# Patient Record
Sex: MALE | Race: WHITE | NOT HISPANIC OR LATINO | Employment: OTHER | URBAN - METROPOLITAN AREA
[De-identification: names, ages, dates, MRNs, and addresses within clinical notes are randomized per-mention and may not be internally consistent; named-entity substitution may affect disease eponyms.]

---

## 2020-11-23 ENCOUNTER — OFFICE VISIT CONVERTED (OUTPATIENT)
Dept: PULMONOLOGY | Facility: CLINIC | Age: 60
End: 2020-11-23
Attending: INTERNAL MEDICINE

## 2020-12-02 ENCOUNTER — HOSPITAL ENCOUNTER (OUTPATIENT)
Dept: GENERAL RADIOLOGY | Facility: HOSPITAL | Age: 60
Discharge: HOME | End: 2020-12-02
Attending: INTERNAL MEDICINE

## 2021-02-09 ENCOUNTER — HOSPITAL ENCOUNTER (OUTPATIENT)
Dept: CARDIOLOGY | Facility: HOSPITAL | Age: 61
Discharge: HOME | End: 2021-02-09
Attending: INTERNAL MEDICINE

## 2021-05-27 ENCOUNTER — OFFICE VISIT CONVERTED (OUTPATIENT)
Dept: PULMONOLOGY | Facility: CLINIC | Age: 61
End: 2021-05-27
Attending: SPECIALIST

## 2021-05-28 VITALS
HEIGHT: 67 IN | SYSTOLIC BLOOD PRESSURE: 142 MMHG | TEMPERATURE: 98.4 F | RESPIRATION RATE: 18 BRPM | OXYGEN SATURATION: 89 % | HEART RATE: 48 BPM | BODY MASS INDEX: 34.53 KG/M2 | DIASTOLIC BLOOD PRESSURE: 80 MMHG | WEIGHT: 220 LBS

## 2021-05-28 NOTE — PROGRESS NOTES
Patient: STEFFEN ALCARAZ     Acct: [id number]     Report: #[id number]  UNIT #: [id number]     : 1960    Encounter Date:2020  PRIMARY CARE: YOSEPH PIKE  ***Signed***  --------------------------------------------------------------------------------------------------------------------  Chief Complaint      Encounter Date      2020            Primary Care Provider      OBINNA PIKE            Referring Provider      OBINNA PIKE            Patient Complaint      Patient is complaining of      New Patient- COPD            VITALS      Height 5 ft 7 in / 170.18 cm      Weight 220 lbs 0 oz / 99.301475 kg      BSA 2.11 m2      BMI 34.5 kg/m2      Temperature 98.4 F / 36.89 C - Temporal      Pulse 48      Respirations 18      Blood Pressure 142/80 Sitting, Right Arm      Pulse Oximetry 89%, nasal cannula , 2 lpm            HPI      The patient is a 60 year old male with a history of COPD, chronic hypoxic and     hypercapnic respiratory failure. He was seeing Dr. Grigsby in pulmonology in     [place].  He sees ALEXANDRA Eddy for primary care. He is referred to     me for COPD, chronic hypoxic and hypercapnic respiratory failure. He has     shortness of breath with activities. He continues to use oxygen with rest,     activity and sleep. He use to use trilogy with sleep, but then sent the machine     back because he was feeling like it did not help him much. He is wanting to go     back on trilogy. He is on Breo and Incruse and albuterol nebulizer which he uses    2-3 times a day.  He is on albuterol for rescue which he uses 1-2 times a day.     He has shortness of breath with activities. He is not able to move around much.     He has an [employer] concentrator that he moves around at home with. He has not had     low dose lung cancer screening CT scan recently. He has not had pulmonary     function test and six minute walk test.            ROS      Constitutional:  Complains of:  Fatigue; Denies: Fever, Weight gain, Weight loss,    Chills, Insomnia, Other      Respiratory/Breathing:  Complains of: Shortness of air, Wheezing, Cough; Denies:    Hemoptysis, Pleuritic pain, Other      Endocrine:  Denies: Polydipsia, Polyuria, Heat/cold intolerance, Diabetes, Other      Eyes:  Denies: Blurred vision, Vision Changes, Other      Ears, nose, mouth, throat:  Denies: Mouth lesions, Thrush, Throat pain,     Hoarseness, Allergies/Hay Fever, Post Nasal Drip, Headaches, Recent Head Injury,    Nose Bleeding, Neck Stiffness, Thyroid Mass, Hearing Loss, Ear Fullness, Dry     Mouth, Nasal or Sinus Pain, Dry Lips, Nasal discharge, Nasal congestion, Other      Cardiovascular:  Denies: Palpitations, Syncope, Claudication, Chest Pain, Wake     up Gasping for air, Leg Swelling, Irregular Heart Rate, Cyanosis, Dyspnea on     Exertion, Other      Gastrointestinal:  Denies: Nausea, Constipation, Diarrhea, Abdominal pain, Vomit    ing, Difficulty Swallowing, Reflux/Heartburn, Dysphagia, Jaundice, Bloating,     Melena, Bloody stools, Other      Genitourinary:  Denies: Urinary frequency, Incontinence, Hematuria, Urgency,     Nocturia, Dysuria, Testicular problems, Other      Musculoskeletal:  Denies: Joint Pain, Joint Stiffness, Joint Swelling, Myalgias,    Other      Hematologic/lymphatic:  DENIES: Lymphadenopathy, Bruising, Bleeding tendencies,     Other      Neurological:  Denies: Headache, Numbness, Weakness, Seizures, Other      Psychiatric:  Denies: Anxiety, Appropriate Effect, Depression, Other      Sleep:  No: Excessive daytime sleep, Morning Headache?, Snoring, Insomnia?, Stop    breathing at sleep?, Other      Integumentary:  Denies: Rash, Dry skin, Skin Warm to Touch, Other      Immunologic/Allergic:  Denies: Latex allergy, Seasonal allergies, Asthma,     Urticaria, Eczema, Other      Immunization status:  No: Up to date            FAMILY/SOCIAL/MEDICAL HX      Surgical History:  Yes: Bowel Surgery  (COLONOSCOPY), Oral Surgery (TONSILLECTOMY    AND ADENOIDECTOMY); No: AAA Repair, Abdominal Surgery, Adenoids, Angioplasty,     Appendectomy, Back Surgery, Bladder Surgery, Breast Surgery, CABG, Carotid     Stenosis, Cholecystectomy, Ear Surgery, Eye Surgery, Head Surgery, Hernia Talley    rgery, Kidney Surgery, Nose Surgery, Orthopedic Surgery, Prostatectomy, Rectal     Surgery, Spinal Surgery, Testicular Surgery, Throat Surgery, Tonsils, Valve     Replacement, Vascular Surgery, Other Surgeries      Diabetes - Family Hx:  Sister      Is Father Still Living?:  No      Is Mother Still Living?:  No       Family History:  Yes      Social History:  No Tobacco Use, No Alcohol Use, No Recreational Drug use      Smoking status:  Former smoker (started at age 12 stopped at age 55 about 1 ppd     )      Anticoagulation Therapy:  No      Antibiotic Prophylaxis:  No      Medical History:  Yes: Arthritis, Hemorrhoids/Rectal Prob (REFLUX), Shortness Of    Breath (EMPHYSEMA), Miscellaneous Medical/oth (ABD. HERNIA ); No: Alcoholism,     Allergies, Anemia, Asthma, Atrial Fibrillation, Blood Disease, Broken Bones,     Cataracts, Chemical Dependency, Chemotherapy/Cancer, Chronic Bronchitis/COPD,     Emphysema, Chronic Liver Disease, Colon Trouble, Colitis, Diverticulitis,     Congestive Heart Failu, Deafness or Ringing Ears, Convulsions, Depression,     Anxiety, Bipolar Disorder, PTSD, Diabetes, Epilepsy, Seizures, Forgetfullness,     Glaucoma, Gall Stones, Gout, Head Injury, Heart Attack, Heart Murmur, GERD,     Hepatitis, Hiatal Hernia, High Blood Pressure, High Cholesterol, HIV (Do not ask    - volu, Jaundice, Kidney or Bladder Disease, Kidney Stones, Migrane Headaches,     Mitral Valve Prolapse, Night sweats, Phlebitis, Psychiatric Care, Reflux     Disease, Rheumatic Fever, Sexually Transmitted Dis, Sinus Trouble, Skin     Disease/Psoriais/Ecz, Stroke, Thyroid Problem, Tuberculosis or Pos TB Te      Psychiatric History      none     "        PREVENTION      Hx Influenza Vaccination:  Yes      Date Influenza Vaccine Given:  Nov 1, 2020      Influenza Vaccine Declined:  No      2 or More Falls in Past Year?:  No      Fall Past Year with Injury?:  No      Hx Pneumococcal Vaccination:  Yes      Encouraged to follow-up with:  PCP regarding preventative exams.      Chart initiated by      Amy Ballard MA            ALLERGIES/MEDICATIONS      Allergies:        Coded Allergies:             SULFAMETHOXAZOLE (Verified  Allergy, Unknown, ANAPHYLACTIC, 11/23/20)           TRIMETHOPRIM (Verified  Allergy, Unknown, ANAPHYLACTIC, 11/23/20)           SULFA(SULFONAMIDE ANTIBIOTICS) (Verified  Allergy, \"stops my lungs\",     11/23/20)      Medications    Last Reconciled on 11/23/20 14:02 by CLARE PAT MD      Cholecalciferol (Vitamin D3) (Vitamin D3) 400 Units Capsule      50 UNITS PO QDAY for 30 Days, #4 CAP         Reported         11/23/20       Fexofenadine HCl (Allegra Allergy) 60 Mg Tablet      60 MG PO BID, #60 TAB 0 Refills         Reported         11/23/20       Pravastatin Sodium (Pravastatin Sodium) 40 Mg Tablet      40 MG PO HS, #30 TAB 0 Refills         Reported         11/23/20       Allopurinol (Zyloprim*) 100 Mg Tablet      100 MG PO QDAY for 30 Days, #30 TAB 0 Refills         Reported         11/23/20       Folic Acid (Folic Acid*) 0.4 Mg Tablet      400 MCG PO QDAY, TAB         Reported         11/23/20       Venlafaxine HCl (Venlafaxine*) 37.5 Mg Tablet      75 MG PO BID, TAB         Reported         11/23/20       Umeclidinium Bromide (Incruse Ellipta) 62.5 Mcg Blst.w.dev      1 PUFF INH RTQDAY, #1 MDI 0 Refills         Reported         11/23/20       Fluticasone/Vilanterol 200-25 Mcg Inh (Breo Ellipta 200-25 Mcg Inh) 1 Each     Blst.w.dev      1 PUFF INH QDAY, #1 INH         Reported         11/23/20       Potassium Chloride (K-Dur*) 10 Meq Tab.prt.sr      10 MEQ PO BID         Reported         12/7/12       Furosemide (Furosemide) 40 Mg " Tablet      80 MG PO BID         Reported         12/7/12       Carvedilol (Coreg) 12.5 Mg Tab      12.5 MG PO BID         Reported         12/7/12       Montelukast Sodium (Singulair*) 10 Mg Tab      10 MG PO HS, TAB         Reported         12/7/12      Current Medications      Current Medications Reviewed 11/23/20            EXAM      CONSTITUTIONAL: Pleasant male on oxygen concentrator, has conversational     dyspnea.        EYES : Pink conjunctive, no ptosis, PERRL.       ENMT : Nose and ears appear normal, normal dentition, mild posterior pharyngeal     wall erythema, no sinus tenderness. Mallampati classification 2.        Neck: Nontender, no masses, no thyromegaly, no nodules.      Resp : Bilateral diminished breath sounds, scattered rhonchi, no wheezing or     crackles.  Resonant to percussion bilaterally.      CVS  : No carotid bruits, s1s2 nl, RRR, no murmur, rubs or gallop, trace pedal     edema, extremities bilaterally nontender to palpation.        Chest wall: Normal rise with inspiration, nontender on palpation.      GI   : Abdomen soft, with no masses, no hepatosplenomegaly, no hernias, BS+      MSK  : Normal gait and station, no digital cyanosis or clubbing       Skin : No rashes, ulcerations or lesions, normal turgor and temperature      Neuro: CN II - XII intact, no sensory deficits, DTRs intact and symmetrical, no     motor weakness      Psych: Appropriate affect, A   Vtials      Vitals:             Height 5 ft 7 in / 170.18 cm           Weight 220 lbs 0 oz / 99.054303 kg           BSA 2.11 m2           BMI 34.5 kg/m2           Temperature 98.4 F / 36.89 C - Temporal           Pulse 48           Respirations 18           Blood Pressure 142/80 Sitting, Right Arm           Pulse Oximetry 89%, nasal cannula , 2 lpm            REVIEW      Results Reviewed      P[facility] Results Reviewed?:  Yes Prev Lab Results, Yes Prev Radiology Results, Yes     Previous [facility] Records      Lab Results      The patient's  office visit note from PCP was reviewed. Other tests and results     were not available for review.            Assessment      COPD (chronic obstructive pulmonary disease)         Centrilobular emphysema - J43.2         Emphysema type: centrilobular            Notes      New Medications      * Fluticasone/Vilanterol 200-25 Mcg Inh (Breo Ellipta 200-25 Mcg Inh) 1 EACH       BLST.W.DEV: 1 PUFF INH QDAY #1      * UMECLIDINIUM BROMIDE (Incruse Ellipta) 62.5 MCG BLST.W.DEV: 1 PUFF INH RTQDAY       #1      * Venlafaxine HCl (Venlafaxine*) 37.5 MG TABLET: 75 MG PO BID      * Folic Acid (Folic Acid*) 0.4 MG TABLET: 400 MCG PO QDAY      * ALLOPURINOL (Zyloprim*) 100 MG TABLET: 100 MG PO QDAY 30 Days #30      * Pravastatin Sodium 40 MG TABLET: 40 MG PO HS #30      * Fexofenadine HCl (Allegra Allergy) 60 MG TABLET: 60 MG PO BID #60      * Cholecalciferol (Vitamin D3) (Vitamin D3) 400 UNITS CAPSULE: 50 UNITS PO QDAY       30 Days #4      * Fluticasone/Umeclidin/Vilanter (Trelegy Ellipta 100-62.5-25) 1 EACH       BLST.W.DEV: 1 PUFF INH RTQDAY #1      New Diagnostics      * PFT-Comp, PrePost,DLCO,BodyBox, Week         Dx: COPD (chronic obstructive pulmonary disease) - J44.9      * 6 Min Walk w O2 Titration Test, Routine         Dx: COPD (chronic obstructive pulmonary disease) - J44.9      * ABGS, Month         Dx: COPD (chronic obstructive pulmonary disease) - J44.9      * Low Dose Chest CT, Week         Dx: COPD (chronic obstructive pulmonary disease) - J44.9      New Office Procedures      * Pneumovax-23, As Soon As Possible         Pneumococcal Vaccine Polyvalen (Pneumovax-23) 25 MCG/0.5 ML VIAL: 25        MICROGRAM INTRAMUSCULARLY Qty 25 MCG      PLAN: The patient is a 60 year old male with a history of COPD with a history of    smoking in the past, chronic hypoxia and hypercapnic respiratory failure.  He     also has obstructive sleep apnea and obesity hypoventilation syndrome and was     suppose to be on Trilogy.      1.  COPD with chronic hypoxic and hypercapnic respiratory failure. Continue with     inhalers. I will switch Breo and Incruse to Trelegy ellipta inhaler once daily.     Use albuterol inhaler and nebulizer as needed.      2. Check pulmonary function test and six minute walk test.        3. I will refer him to pulmonary rehab next office visit.      4. Chronic hypoxic and hypercapnic respiratory failure.  Continue oxygen with     rest, activities and sleep. He will need to go back on Trilogy machine with     sleep. I will check arterial blood gas.  He will call our office to tell us     which [employer] company he used for Trilogy machine. We will call them and try to get     the Trilogy machine back on.  I will review the Trilogy machine data once it is     available.      5. Low dose lung cancer screening CT scan indication was discussed with him.  He    understands and is agreeable and I will order low dose lung cancer screening CT     scan now.  Shared decision making was done to order low dose lung cancer     screening CT scan.      6. Follow up in 2-3 months, sooner if needed.            Patient Education      Education resources provided:  Yes      Patient Education Provided:  Acute Bronchitis            Patient Education:        Chronic Obstructive Pulmonary Disease            Electronically signed by Franco Sanderson  11/25/2020 21:06       Disclaimer: Converted document may not contain table formatting or lab diagrams. Please see [facility] for the authenticated document.

## 2021-06-06 VITALS
BODY MASS INDEX: 32.02 KG/M2 | RESPIRATION RATE: 14 BRPM | DIASTOLIC BLOOD PRESSURE: 63 MMHG | HEART RATE: 77 BPM | HEIGHT: 67 IN | OXYGEN SATURATION: 93 % | SYSTOLIC BLOOD PRESSURE: 116 MMHG | WEIGHT: 204 LBS

## 2021-06-06 NOTE — PROGRESS NOTES
Patient: STEFFEN ALCARAZ     Acct: [id number]     Report: #[id number]  UNIT #: [id number]     : 1960    Encounter Date:2021  PRIMARY CARE: YOSEPH PIKE  ***Signed***  --------------------------------------------------------------------------------------------------------------------  Chief Complaint      Encounter Date      May 27, 2021            Primary Care Provider      OBINNA PIKE            Referring Provider      OBINNA PIKE            Patient Complaint      Patient is complaining of      Patient is here for a follow up post rehab stay            VITALS      Height 5 ft 7 in / 170.18 cm      Weight 204 lbs 0 oz / 92.10559 kg      BSA 2.04 m2      BMI 32.0 kg/m2      Pulse 77      Respirations 14      Blood Pressure 116/63 Sitting, Left Arm      Pulse Oximetry 93%, on 6liters            HPI      This is a 60-year-old very pleasant gentleman being followed by Dr. Sanderson.      Patient has history obstructive sleep apnea and the COPD.  He is a former smoker    and quit about 13 years ago and at present patient is on Trelegy and noninvasive    positive pressure ventilation trilogy for the chronic hypercapnic, hypoxic     respiratory failure.  Patient continues to complaining of shortness of breath     with any movement and he is in check dial is 50.  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  Unable to review any sleep     study and the patient stated that he has studied in [place] several years ago.      Never had any alpha-1 antitrypsin done.  Patient CT scan of the chest and the     pulmonary function testing are as reported in the hospital information is     reviewed as well.  Patient denies of having any fever, chills, night sweating or    hemoptysis.  Denies any recent travel history or any exposure to anybody who is     sick around him.  At present patient is on oxygen.  Patient also has been having    the leg swellings and was in the hospital  recently.                  [facility] [place]            Patient: STEFFEN ALCARAZ   Acct: #[id number]   Report: #[id number]            UNIT #: [id number]    DOS: 20 1215      INSURANCE:MEDICAID-[place]   ORDER #:[id number]      LOCATION:[facility]     : 1960            PROVIDERS      ADMITTING:     ATTENDING: Franco Sanderson      FAMILY:  NONE,MD   ORDERING:  Franco Sanderson         OTHER:    DICTATING:  Vinnie Hall MD            REQ #:[id number]   [facility]      REASON FOR EXAM:        REASON FOR VISIT:  FORMER SMOKER            *******Signed******         PROCEDURE:   CT LOW DOSE CHEST SCREENING             COMPARISON:   None.             REASON FOR SCREENING:   Patient is 60 years of age, asymptomatic, and has a     smoking history of more       than 30 pack years.      SMOKING STATUS:   Former smoker.  Years since quittin                SCREENING VISIT:   Baseline.                   TECHNIQUE:   Axial unenhanced LDCT images from the apices through mid-kidney     were obtained.       Evaluation of solid organs and vascular structures is suboptimal due to the lack    of IV contrast.       Imaging was performed on a [employer] In[employer]uity 128 CT scanner.             RADIATION:   CT Dose Index Vol (CTDIvol):    3.085  mGy         Dose Length Product (DLP):    123.1  mGy-cm             DIAGNOSTIC QUALITY:   Satisfactory             LUNG-RADS CLASSIFICATION:   Lung RADS 4A             FINDINGS:         Lung window images reveal marked centrilobular emphysema, with numerous blebs at    the lung apices.             8 mm ill-defined noncalcified solitary pulmonary nodule is seen in the posterior    right upper lobe,       well seen on series 204, image 58 and series 202 image 129.              Subsegmental atelectasis and/or linear fibrosis is seen in the right middle lobe    and lingula.              Mediastinal windows reveal no mediastinal, hilar, or axillary adenopathy.  A few    coronary artery       calcifications are evident.             CONTINUED ON NEXT PAGE...             CONCLUSION:         Low-dose screening CT scan of the chest demonstrating 8 mm ill-defined     noncalcified solitary       pulmonary nodule in the right upper lobe.             Follow-up low-dose CT of the chest is recommended in 3 months.             Lung RADS 4A               ERNESTO JIMÉNEZ MD             Electronically Signed and Approved By: ERNESTO JIMÉNEZ MD on 12/02/2020 at 14:34               _________________________________________________________________    _____________________________________             821 - DIAGNOSTIC REPORT             PULMONARY FUNCTION TEST             SPIROMETRY:      Spirometry shows very severe obstructive defect.      FEV1/FVC ratio 48.      FEV1 is 0.61 L, 19% of predicted      FVC is 1.35 L, 31% of predicted             BRONCHODILATOR RESPONSE:      There is a significant response to bronchodilator administration.  FEV1      increased from 0.61 L to 0.67 L, 10% change.  FVC increased from 1.35 L to      1.59 L, 18% change.             LUNG VOLUMES:      Lung volumes show air trapping.      Total lung capacity is 5.43 L, 85% of predicted.      Residual volume is 3.85 L, 183% of predicted.             DIFFUSION:      Diffusion capacity is very severely decreased, 17% of predicted.             FLOW VOLUME LOOP:      Flow volume loop is compatible with very severe obstructive process.             CONCLUSION:      Low FEV1/FVC with low FEV1 and FVC with air trapping and low diffusion      capacity.  It suggests very severe obstructive airway disease, likely      emphysema.  It suggests end-stage lung disease.  Some bronchial reversibility      is seen.  Please correlate clinically.               To be electronically signed in [email or web address]      [id number] CLARE PAT M.D.              NK:lencho      D:   02/18/2021 14:24      T:  02/18/2021 16:50      #[id number]            ROS      Constitutional:  Denies: Fatigue, Fever, Weight gain, Weight loss, Chills,     Insomnia, Other      Respiratory/Breathing:  Complains of: Shortness of air; Denies: Wheezing, Cough,    Hemoptysis, Pleuritic pain, Other      Endocrine:  Denies: Polydipsia, Polyuria, Heat/cold intolerance, Diabetes, Other      Eyes:  Denies: Blurred vision, Vision Changes, Other      Ears, nose, mouth, throat:  Denies: Congestion, Dysphagia, Hearing Changes, Nose    Bleeding, Nasal Discharge, Throat pain, Tinnitus, Other      Cardiovascular:  Denies: Chest Pain, Exertional dyspnea, Peripheral Edema,     Palpitations, Syncope, Wake up Gasping for air, Orthopnea, Tachycardia, Other      Gastrointestinal:  Denies: Abdominal pain/cramping, Bloody stools, Constipation,    Diarrhea, Melena, Nausea, Vomiting, Other      Genitourinary:  Denies: Dysuria, Urinary frequency, Incontinence, Hematuria,     Urgency, Other      Musculoskeletal:  Denies: Joint Pain, Joint Stiffness, Joint Swelling, Myalgias,    Other      Hematologic/lymphatic:  DENIES: Lymphadenopathy, Bruising, Bleeding tendencies,     Other      Neurologic:  Denies: Headache, Numbness, Weakness, Seizures, Other      Psychiatric:  Denies: Anxiety, Appropriate Effect, Depression, Other      Sleep:  No: Excessive daytime sleep, Morning Headache?, Snoring, Insomnia?, Stop    breathing at sleep?, Other      Integumentary:  Denies: Rash, Dry skin, Skin Warm to Touch, Other            FAMILY/SOCIAL/MEDICAL HX      Surgical History:  Yes: Bowel Surgery (COLONOSCOPY), Oral Surgery (TONSILLECTOMY    AND ADENOIDECTOMY); No: AAA Repair, Abdominal Surgery, Adenoids, Angioplasty,     Appendectomy, Back Surgery, Bladder Surgery, Breast Surgery, CABG, Carotid     Stenosis, Cholecystectomy, Ear Surgery, Eye Surgery, Head Surgery, Hernia     Surgery, Kidney Surgery, Nose Surgery, Orthopedic Surgery, Prostatectomy, Rectal     Surgery, Spinal Surgery, Testicular Surgery, Throat Surgery, Tonsils, Valve     Replacement, Vascular Surgery, Other Surgeries      Diabetes - Family Hx:  Sister      Is Father Still Living?:  No      Is Mother Still Living?:  No       Family History:  Yes      Social History:  No Tobacco Use, No Alcohol Use, No Recreational Drug use      Smoking status:  Former smoker (started at age 12 stopped at age 55 about 1 ppd     )      Anticoagulation Therapy:  No      Antibiotic Prophylaxis:  No      Medical History:  Yes: Arthritis, Hemorrhoids/Rectal Prob (REFLUX), Shortness Of    Breath (EMPHYSEMA), Miscellaneous Medical/oth (ABD. HERNIA ); No: Alcoholism,     Allergies, Anemia, Asthma, Atrial Fibrillation, Blood Disease, Broken Bones,     Cataracts, Chemical Dependency, Chemotherapy/Cancer, Chronic Bronchitis/COPD,     Emphysema, Chronic Liver Disease, Colon Trouble, Colitis, Diverticulitis,     Congestive Heart Failu, Deafness or Ringing Ears, Convulsions, Depression,     Anxiety, Bipolar Disorder, PTSD, Diabetes, Epilepsy, Seizures, Forgetfullness,     Glaucoma, Gall Stones, Gout, Head Injury, Heart Attack, Heart Murmur, GERD,     Hepatitis, Hiatal Hernia, High Blood Pressure, High Cholesterol, HIV (Do not ask    - volu, Jaundice, Kidney or Bladder Disease, Kidney Stones, Migrane Headaches,     Mitral Valve Prolapse, Night sweats, Phlebitis, Psychiatric Care, Reflux     Disease, Rheumatic Fever, Sexually Transmitted Dis, Sinus Trouble, Skin     Disease/Psoriais/Ecz, Stroke, Thyroid Problem, Tuberculosis or Pos TB Te      Psychiatric History      none            PREVENTION      Hx Influenza Vaccination:  Yes      Date Influenza Vaccine Given:  Nov 1, 2020      Influenza Vaccine Declined:  No      2 or More Falls in Past Year?:  No      Fall Past Year with Injury?:  No      Hx Pneumococcal Vaccination:  Yes      Encouraged to follow-up with:  PCP regarding preventative exams.      Chart initiated by      Cristel  "[facility]            ALLERGIES/MEDICATIONS      Allergies:        Coded Allergies:             SULFA (SULFONAMIDE ANTIBIOTICS) (Verified  Allergy, Unknown, \"stops my     lungs\", 5/27/21)           SULFAMETHOXAZOLE (Verified  Allergy, Unknown, ANAPHYLACTIC, 5/27/21)           TRIMETHOPRIM (Verified  Allergy, Unknown, ANAPHYLACTIC, 5/27/21)      Medications    Last Reconciled on 5/27/21 15:32 by Caleb Johns      Fluticasone/Umeclidin/Vilanter (Trelegy Ellipta 100-62.5-25) 1 Each Blst.w.dev      1 PUFF INH RTQDAY, #1 INH 9 Refills         Prov: Franco Sanderson         11/23/20       Cholecalciferol (Vitamin D3) (Vitamin D3) 400 Units Capsule      50 UNITS PO QDAY for 30 Days, #4 CAP         Reported         11/23/20       Fexofenadine HCl (Allegra Allergy) 60 Mg Tablet      60 MG PO BID, #60 TAB 0 Refills         Reported         11/23/20       Pravastatin Sodium (Pravastatin Sodium) 40 Mg Tablet      40 MG PO HS, #30 TAB 0 Refills         Reported         11/23/20       Allopurinol (Zyloprim*) 100 Mg Tablet      100 MG PO QDAY for 30 Days, #30 TAB 0 Refills         Reported         11/23/20       Folic Acid (Folic Acid) 0.4 Mg Tablet      400 MCG PO QDAY, TAB         Reported         11/23/20       Venlafaxine HCl (Venlafaxine*) 37.5 Mg Tablet      75 MG PO BID, TAB         Reported         11/23/20       Umeclidinium Bromide (Incruse Ellipta) 62.5 Mcg Blst.w.dev      1 PUFF INH RTQDAY, #1 MDI 0 Refills         Reported         11/23/20       Fluticasone/Vilanterol 200-25 Mcg Inh (Breo Ellipta 200-25 Mcg Inh) 1 Each     Blst.w.dev      1 PUFF INH QDAY, #1 INH         Reported         11/23/20       Potassium Chloride (K-Dur*) 10 Meq Tab.prt.sr      10 MEQ PO BID         Reported         12/7/12       Furosemide (Furosemide) 40 Mg Tablet      80 MG PO BID         Reported         12/7/12       Carvedilol (Coreg) 12.5 Mg Tab      12.5 MG PO BID         Reported         12/7/12       Montelukast Sodium (Singulair*) 10 " Mg Tab      10 MG PO HS, TAB         Reported         12/7/12      Current Medications      Current Medications Reviewed 5/27/21            EXAM      Very pleasant gentleman.  Collected very good x3.  Answering the questions     appropriately and not in any acute distress at rest.  Comfortable      Vitals      Vitals:             Height 5 ft 7 in / 170.18 cm           Weight 204 lbs 0 oz / 92.94354 kg           BSA 2.04 m2           BMI 32.0 kg/m2           Pulse 77           Respirations 14           Blood Pressure 116/63 Sitting, Left Arm           Pulse Oximetry 93%, on 6liters            Constitutional      General appearance:  Chronically ill            Eyes      Conjunctiva:  Bilateral: Normal            ENMT      Nasal cavity:           Nostril:  Bilateral: Septal deviation         Discharge:  Bilateral: None            Neck      JVP:  Normal      Trachea:  Midline            Respiratory      Respiratory effort:  normal      Chest appearance:  Increased AP diameter      Auscultation:  Bilateral: Diminished throughout, Prolonged expiration, Rhonchi            Cardiovascular      Rhythm:  regular      Heart sounds:  NORMAL: S1, S2      Peripheral edema:           Leg:  Bilateral: 1+         Other (describe):  No clubbing.  No cyanosis.            Gastrointestinal      Abdomen description:  Normal (Soft.  Bowel sounds present.  Nontender)      Hepatosplenomegaly:  none      Mass:  None            Skin      General color:  Normal            Lymphatic      Bilateral: None            Psychiatric      Normal            Assessment      Emphysema of lung         Pulmonary emphysema, unspecified emphysema type - J43.9         Emphysema type: unspecified            Hypoxemia - R09.02            Cardiomyopathy         Cardiomyopathy, unspecified type - I42.9         Cardiomyopathy type: unspecified            Notes      Follow-up with the cardiology as before.      Encourage the patient to continue to take the trilogy  and will check the     compliance the next visit.  Give him the information about the Acapella to take     it at least 5-10 times every couple of hours while awake.      As he is in check dial is 50 and has to clinic is very poor hence I decided to     go ahead and stop the inhalation and give the prescription for Pulmicort,     Perforomist, Yupelri, albuterol with a nebulizer and the instructions were     given.      We will do the alpha-1.      Consult the pulmonary rehab      Patient is scheduled for the CT scan of the chest and will get the report with     the next visit and will be done within 3 months with a .      New Medications      * NEB-Albuterol Sulf (Albuterol) 2.5 MG/3 ML VIAL.NEB: 2.5 MG INH Q4H PRN       SHORTNESS OF BREATH #120         Instructions: Submit to Medicare B if applicable. Call for Diagnosis if        needed.      * Neb-Budesonide (Budesonide) 0.5 MG/2 ML AMPUL.NEB: 0.5 MG INH RTBID 30 Days       #60         Instructions: DIAGNOSIS CODE REQUIRED PRIOR TO PRESCRIBING.      * Formoterol Fumarate (Perforomist) 20 MCG/2 ML VIAL.NEB: 20 MCG INH BID 30 Days      #120      * REVEFENACIN (YUPELRI) 175 MCG/3 ML NEBU: 175 MCG INH RTQDAY 30 Days #30      New Diagnostics      * Alpha 1 Antitrypsin , Month         Dx: Emphysema of lung - J43.9      New Referrals      * Pulmonary Rehab, Routine         Dx: Emphysema of lung - J43.9            Electronically signed by ZORAN AUGUSTIN  05/28/2021 09:32       Disclaimer: Converted document may not contain table formatting or lab diagrams. Please see [employer] for the authenticated document.

## 2021-09-30 ENCOUNTER — OFFICE VISIT (OUTPATIENT)
Dept: CARDIOLOGY | Facility: CLINIC | Age: 61
End: 2021-09-30

## 2021-09-30 VITALS
BODY MASS INDEX: 31.39 KG/M2 | DIASTOLIC BLOOD PRESSURE: 78 MMHG | WEIGHT: 200 LBS | SYSTOLIC BLOOD PRESSURE: 131 MMHG | HEIGHT: 67 IN | HEART RATE: 81 BPM

## 2021-09-30 DIAGNOSIS — J43.9 PULMONARY EMPHYSEMA, UNSPECIFIED EMPHYSEMA TYPE (HCC): ICD-10-CM

## 2021-09-30 DIAGNOSIS — I42.0 NONISCHEMIC DILATED CARDIOMYOPATHY (HCC): Primary | ICD-10-CM

## 2021-09-30 DIAGNOSIS — I48.19 ATRIAL FIBRILLATION, PERSISTENT (HCC): ICD-10-CM

## 2021-09-30 DIAGNOSIS — I50.812 CHRONIC RIGHT-SIDED HEART FAILURE (HCC): ICD-10-CM

## 2021-09-30 DIAGNOSIS — G47.33 OBSTRUCTIVE SLEEP APNEA: ICD-10-CM

## 2021-09-30 PROCEDURE — 93000 ELECTROCARDIOGRAM COMPLETE: CPT | Performed by: INTERNAL MEDICINE

## 2021-09-30 PROCEDURE — 99204 OFFICE O/P NEW MOD 45 MIN: CPT | Performed by: INTERNAL MEDICINE

## 2021-09-30 RX ORDER — FOLIC ACID 1 MG/1
1 TABLET ORAL DAILY
COMMUNITY
Start: 2021-05-04

## 2021-09-30 RX ORDER — FEXOFENADINE HYDROCHLORIDE 60 MG/1
60 TABLET, FILM COATED ORAL DAILY
COMMUNITY

## 2021-09-30 RX ORDER — CARVEDILOL 12.5 MG/1
12.5 TABLET ORAL 2 TIMES DAILY
Qty: 180 TABLET | Refills: 3 | Status: SHIPPED | OUTPATIENT
Start: 2021-09-30

## 2021-09-30 RX ORDER — BUSPIRONE HYDROCHLORIDE 10 MG/1
10 TABLET ORAL 3 TIMES DAILY
COMMUNITY
Start: 2021-08-10 | End: 2022-08-11

## 2021-09-30 RX ORDER — CLOTRIMAZOLE AND BETAMETHASONE DIPROPIONATE 10; .64 MG/G; MG/G
CREAM TOPICAL
COMMUNITY
Start: 2021-08-19

## 2021-09-30 RX ORDER — METOPROLOL TARTRATE 50 MG/1
50 TABLET, FILM COATED ORAL 2 TIMES DAILY
COMMUNITY
Start: 2021-05-03 | End: 2021-09-30

## 2021-09-30 RX ORDER — DILTIAZEM HYDROCHLORIDE 60 MG/1
60 TABLET, FILM COATED ORAL 3 TIMES DAILY
COMMUNITY
Start: 2021-05-03 | End: 2021-09-30

## 2021-09-30 RX ORDER — MONTELUKAST SODIUM 10 MG/1
10 TABLET ORAL
COMMUNITY
Start: 2021-05-03 | End: 2022-01-10 | Stop reason: SDUPTHER

## 2021-09-30 RX ORDER — ATORVASTATIN CALCIUM 40 MG/1
40 TABLET, FILM COATED ORAL
COMMUNITY
Start: 2021-05-03

## 2021-09-30 RX ORDER — POTASSIUM CHLORIDE 20 MEQ/1
20 TABLET, EXTENDED RELEASE ORAL
COMMUNITY
Start: 2021-05-03

## 2021-09-30 RX ORDER — CARVEDILOL 12.5 MG/1
6.25 TABLET ORAL
COMMUNITY
End: 2021-09-30

## 2021-09-30 RX ORDER — REVEFENACIN 175 UG/3ML
175 SOLUTION RESPIRATORY (INHALATION)
COMMUNITY
End: 2022-01-10 | Stop reason: SDUPTHER

## 2021-09-30 RX ORDER — FUROSEMIDE 40 MG/1
40 TABLET ORAL DAILY
COMMUNITY

## 2021-09-30 RX ORDER — ALLOPURINOL 300 MG/1
300 TABLET ORAL DAILY
COMMUNITY
Start: 2021-05-04

## 2021-09-30 RX ORDER — VENLAFAXINE HYDROCHLORIDE 75 MG/1
75 CAPSULE, EXTENDED RELEASE ORAL DAILY
COMMUNITY
Start: 2021-05-03

## 2021-09-30 RX ORDER — LISINOPRIL 5 MG/1
5 TABLET ORAL DAILY
Qty: 90 TABLET | Refills: 3 | Status: SHIPPED | OUTPATIENT
Start: 2021-09-30

## 2021-09-30 NOTE — PROGRESS NOTES
Chief Complaint  Establish Care      History of Present Illness  Fly Cross presents to [facility] CARDIOLOGY    This is a very pleasant 60-year-old gentleman with past medical history significant for severe dilated cardiomyopathy; hypertension, COPD, paroxysmal atrial fibrillation, presents to clinic to establish cardiology care.  He has been doing well overall.  He has chronic lower extremity edema which has been stable.  He monitors his weight on daily basis and denies weight gain.  He is compliant with his medications including Eliquis without bleeding or other side effects.  He has no chest discomfort, orthopnea, palpitations, presyncope or syncope.  He is on home oxygen at 6 L/min.  Apparently, he never had defibrillator placed due to concerns about recurrent skin infection.      Past Medical History:   Diagnosis Date   • Arthritis    • Congestive heart failure (CHF) (CMS/[facility])    • COPD (chronic obstructive pulmonary disease) (CMS/[facility])    • Rhinitis, allergic    • Sleep apnea    • SOB (shortness of breath)          Current Outpatient Medications:   •  allopurinol (ZYLOPRIM) 300 MG tablet, Take 300 mg by mouth Daily., Disp: , Rfl:   •  apixaban (ELIQUIS) 5 MG tablet tablet, Take 5 mg by mouth 2 (two) times a day., Disp: , Rfl:   •  atorvastatin (LIPITOR) 40 MG tablet, Take 40 mg by mouth., Disp: , Rfl:   •  busPIRone (BUSPAR) 10 MG tablet, Take 10 mg by mouth 3 (Three) Times a Day., Disp: , Rfl:   •  carvedilol (COREG) 12.5 MG tablet, Take 6.25 mg by mouth., Disp: , Rfl:   •  clotrimazole-betamethasone (LOTRISONE) 1-0.05 % cream, Apply to affected area externally three times a day, Disp: , Rfl:   •  dilTIAZem (CARDIZEM) 60 MG tablet, Take 60 mg by mouth 3 (Three) Times a Day., Disp: , Rfl:   •  fexofenadine (ALLEGRA) 60 MG tablet, Take 60 mg by mouth Daily., Disp: , Rfl:   •  folic acid (FOLVITE) 1 MG tablet, Take 1 mg by mouth Daily., Disp: , Rfl:   •  metoprolol tartrate (LOPRESSOR) 50  "MG tablet, Take 50 mg by mouth 2 (two) times a day., Disp: , Rfl:   •  montelukast (SINGULAIR) 10 MG tablet, Take 10 mg by mouth., Disp: , Rfl:   •  potassium chloride (K-DUR,KLOR-CON) 20 MEQ CR tablet, Take 20 mEq by mouth., Disp: , Rfl:   •  revefenacin (Yupelri) 175 MCG/3ML nebulizer solution, Inhale 175 mcg., Disp: , Rfl:   •  venlafaxine XR (EFFEXOR-XR) 75 MG 24 hr capsule, Take 75 mg by mouth Daily., Disp: , Rfl:     There are no discontinued medications.  Allergies   Allergen Reactions   • Sulfa Antibiotics Unknown - Low Severity     Tongue swelling     • Latex, Natural Rubber Rash        Social History     Tobacco Use   • Smoking status: Former Smoker   • Smokeless tobacco: Never Used   Vaping Use   • Vaping Use: Never used   Substance Use Topics   • Alcohol use: Yes   • Drug use: Not Currently     Types: Marijuana       Family History   Problem Relation Age of Onset   • Diabetes Sister         Unspecified type   • No Known Problems Mother    • No Known Problems Father         Objective     /78   Pulse 81   Ht 170.2 cm (67\")   Wt 90.7 kg (200 lb)   BMI 31.32 kg/m²       Physical Exam  Constitutional:       General: Awake. Not in acute distress.     Appearance: Normal appearance.   Neck:      Vascular: No carotid bruit, hepatojugular reflux or JVD.   Cardiovascular:      Rate and Rhythm: Normal rate and regular rhythm.      Chest Wall: PMI is not displaced.      Heart sounds: Distant heart sounds, S1 normal and S2 normal. No murmur heard.   No friction rub. No gallop. No S3 or S4 sounds.    Pulmonary:      Effort: Pulmonary effort is normal.      Breath sounds: Normal breath sounds. No wheezing, rhonchi or rales.   Ext.  1+ pitting edema bilaterally with chronic venous stasis changes     Skin:     General: Skin is warm and dry.      Coloration: Skin is not cyanotic.      Findings: No petechiae or rash.   Neurological:      Mental Status: Alert and oriented x 3  Psychiatric:         Behavior: " Behavior is cooperative.       Result Review :     No results found for: PROBNP  CMP    CMP 4/23/21 4/23/21 4/23/21 4/24/21 4/24/21 4/25/21    0521 0521 2048 0732 0732    Glucose  105   98 101   BUN  14   20 23   Creatinine  0.6 (A)   0.7 0.7   eGFR  Am  166   139 139   Sodium  140   137 138   Potassium  3.3 (A) 4.8  4.0 3.9   Chloride  96 (A)   94 (A) 95 (A)   Calcium  8.3 (A)   8.6 8.6   Albumin 2.7 (A)   3.0 (A)     Total Bilirubin 1.87 (A)   1.89 (A)     Alkaline Phosphatase 64   66     AST (SGOT) 14   20     ALT (SGPT) 21   25     (A) Abnormal value       Comments are available for some flowsheets but are not being displayed.           CBC w/diff    CBC w/Diff 4/11/21 4/12/21 4/13/21   Hemoglobin 7.7 (A) 8.1 (A) 8.8 (A)   Hematocrit 23.4 (A) 24.3 (A) 26.4 (A)   (A) Abnormal value             No results found for: TSH   No results found for: FREET4   No results found for: DDIMERQUANT  Magnesium   Date Value Ref Range Status   04/25/2021 1.9 1.9 - 2.7 mg/dL Final      No results found for: DIGOXIN   No results found for: TROPONINT   No results found for: POCTROP(              ECG 12 Lead    Date/Time: 9/30/2021 4:20 PM  Performed by: Bethanie Tam MD  Authorized by: Bethanie Tam MD   Previous ECG: no previous ECG available  Comments: ECG: Normal sinus rhythm, right bundle branch block, left anterior fascicular block              No results found for this or any previous visit.                Diagnoses and all orders for this visit:    1. Nonischemic dilated cardiomyopathy (HCC) (Primary)    2. Atrial fibrillation, persistent (HCC)    3. Pulmonary emphysema, unspecified emphysema type (HCC)    4. Obstructive sleep apnea    5. Chronic right-sided heart failure (HCC)        Assessment and plan:    -Severe nonischemic dilated cardiomyopathy with right ventricular failure; previous left heart catheterization from 2012 showed normal coronaries and severe LV systolic dysfunction.  Last echo from April  2021 showed LVEF of 20 to 25%; moderate RVE and moderate RV systolic dysfunction.  He appears to be euvolemic on physical examination.  Continue furosemide at the current dose.  Diltiazem will be discontinued.  Carvedilol dose will be increased to 12.5 mg twice daily.  He will be started on lisinopril 5 mg daily.  He never had ICD placement due to concern about recurrent skin infection. He will be referred to EP for consideration of subcutaneous ICD versus conventional ICD device placement.    -Hypertension: Blood pressure is stable.  Blood pressure medications will be adjusted as discussed above.  Continue routine blood pressure monitoring.    -Oxygen dependent COPD: Clinically stable.  Continue current management.    -Persistent atrial fibrillation: He is currently in normal sinus rhythm.  Continue carvedilol and Eliquis.      Patient will be seen back in 3 months or sooner if needed      Follow Up       No follow-ups on file.    Patient was given instructions and counseling regarding his condition or for health maintenance advice. Please see specific information pulled into the AVS if appropriate.

## 2021-10-12 ENCOUNTER — OFFICE VISIT (OUTPATIENT)
Dept: PULMONOLOGY | Facility: CLINIC | Age: 61
End: 2021-10-12

## 2021-10-12 VITALS
SYSTOLIC BLOOD PRESSURE: 135 MMHG | BODY MASS INDEX: 32.02 KG/M2 | TEMPERATURE: 98.7 F | RESPIRATION RATE: 19 BRPM | HEART RATE: 89 BPM | HEIGHT: 67 IN | WEIGHT: 204 LBS | DIASTOLIC BLOOD PRESSURE: 87 MMHG | OXYGEN SATURATION: 90 %

## 2021-10-12 DIAGNOSIS — J30.2 SEASONAL ALLERGIES: ICD-10-CM

## 2021-10-12 DIAGNOSIS — F17.211 NICOTINE DEPENDENCE, CIGARETTES, IN REMISSION: ICD-10-CM

## 2021-10-12 DIAGNOSIS — J96.12 CHRONIC RESPIRATORY FAILURE WITH HYPOXIA AND HYPERCAPNIA (HCC): ICD-10-CM

## 2021-10-12 DIAGNOSIS — G47.33 OSA (OBSTRUCTIVE SLEEP APNEA): ICD-10-CM

## 2021-10-12 DIAGNOSIS — R91.1 LUNG NODULE: ICD-10-CM

## 2021-10-12 DIAGNOSIS — J43.9 PULMONARY EMPHYSEMA, UNSPECIFIED EMPHYSEMA TYPE (HCC): ICD-10-CM

## 2021-10-12 DIAGNOSIS — R06.09 DYSPNEA ON EXERTION: ICD-10-CM

## 2021-10-12 DIAGNOSIS — J96.11 CHRONIC RESPIRATORY FAILURE WITH HYPOXIA AND HYPERCAPNIA (HCC): ICD-10-CM

## 2021-10-12 DIAGNOSIS — R05.3 CHRONIC COUGH: ICD-10-CM

## 2021-10-12 DIAGNOSIS — Z23 ENCOUNTER FOR IMMUNIZATION: ICD-10-CM

## 2021-10-12 DIAGNOSIS — R09.82 POST-NASAL DRIP: ICD-10-CM

## 2021-10-12 DIAGNOSIS — J44.9 CHRONIC OBSTRUCTIVE PULMONARY DISEASE, UNSPECIFIED COPD TYPE (HCC): Primary | ICD-10-CM

## 2021-10-12 PROCEDURE — 90471 IMMUNIZATION ADMIN: CPT | Performed by: NURSE PRACTITIONER

## 2021-10-12 PROCEDURE — 99214 OFFICE O/P EST MOD 30 MIN: CPT | Performed by: NURSE PRACTITIONER

## 2021-10-12 PROCEDURE — 90686 IIV4 VACC NO PRSV 0.5 ML IM: CPT | Performed by: NURSE PRACTITIONER

## 2021-10-12 RX ORDER — ASPIRIN 81 MG/1
81 TABLET ORAL DAILY
COMMUNITY
Start: 2021-05-04 | End: 2021-10-12

## 2021-10-12 RX ORDER — FORMOTEROL FUMARATE 20 UG/2ML
20 SOLUTION RESPIRATORY (INHALATION)
COMMUNITY
End: 2022-01-10 | Stop reason: SDUPTHER

## 2021-10-12 RX ORDER — FLUTICASONE PROPIONATE AND SALMETEROL 232; 14 UG/1; UG/1
1 POWDER, METERED RESPIRATORY (INHALATION)
COMMUNITY
Start: 2021-05-03 | End: 2021-10-12

## 2021-10-12 RX ORDER — BUDESONIDE 0.5 MG/2ML
0.5 INHALANT ORAL
COMMUNITY
End: 2022-01-10 | Stop reason: SDUPTHER

## 2021-10-12 RX ORDER — PRAVASTATIN SODIUM 40 MG
40 TABLET ORAL DAILY
COMMUNITY
End: 2021-10-12

## 2021-10-12 RX ORDER — RANITIDINE 150 MG/1
150 TABLET ORAL
COMMUNITY
End: 2021-10-12

## 2021-10-12 RX ORDER — LORATADINE 10 MG/1
10 TABLET ORAL DAILY
COMMUNITY
Start: 2021-05-04 | End: 2022-01-10 | Stop reason: SDUPTHER

## 2021-10-12 NOTE — PROGRESS NOTES
Primary Care Provider  Bebo Beach APRN     Referring Provider  No ref. provider found     Chief Complaint  COPD (3mo f/u ), Shortness of Breath, and Cough (productive- clear )    Subjective          Fly Cross presents to [facility] PULMONARY & CRITICAL CARE MEDICINE  History of Present Illness  Fly Cross is a 61 y.o. male patient of Dr. Sanderson with a history of COPD, sleep apnea, tobacco abuse of cigarettes in remission, chronic hypoxic and hypercapnic respiratory failure, and dyspnea on exertion.  He is here for follow-up visit today.    Patient states that he is doing well since his last visit.  He denies having using antibiotics or steroids for his lungs.  He continues to use Yupelri, Perforomist, and Pulmicort as prescribed.  He uses his albuterol inhaler approximately once a week.  He states that he has been trying to use his trilogy mask at night but has issues with the mask.  He states that he breaks him out and he will be in need of a new mask.  I will place this order today.  He wears 5 L of oxygen continuously.  He states that his breathing is at baseline.  He describes it as moderate in severity, worse with exertion and improved with rest.  He does have productive cough with clear sputum but denies fevers or chills.  He quit smoking approximately 13 years ago and will be due due for a repeat low-dose CT scan in December 2021.  I will place his order today.  Overall, patient states he is doing well and has no complaints at this time.  He is up-to-date with his pneumonia vaccine.  He would like to receive a flu vaccine today.  Lengthy discussion spent on patient receiving the Covid vaccine since it has been approximately 3 months since he received antibiotics for Covid treatment.     His history of smoking is      Tobacco Use: Medium Risk   • Smoking Tobacco Use: Former Smoker   • Smokeless Tobacco Use: Never Used   .    Review of Systems   Constitutional: Negative for  chills, fatigue, fever, unexpected weight gain and unexpected weight loss.   HENT: Negative for congestion (Nasal), hearing loss, mouth sores, nosebleeds, postnasal drip, sore throat and trouble swallowing.    Eyes: Negative for blurred vision and visual disturbance.   Respiratory: Positive for cough and shortness of breath. Negative for apnea and wheezing.         Negative for Hemoptysis     Cardiovascular: Negative for chest pain, palpitations and leg swelling.   Gastrointestinal: Negative for abdominal pain, constipation, diarrhea, nausea, vomiting and GERD.        Negative for Jaundice  Negative for Bloating  Negative for Melena   Musculoskeletal: Negative for joint swelling and myalgias.        Negative for Joint pain  Negative for Joint stiffness   Skin: Negative for color change.        Negative for cyanosis   Neurological: Negative for syncope, weakness, numbness and headache.      Sleep: Negative for Excessive daytime sleepiness  Negative for morning headaches  Negative for Snoring    Family History   Problem Relation Age of Onset   • Diabetes Sister         Unspecified type   • No Known Problems Mother    • No Known Problems Father         Social History     Socioeconomic History   • Marital status: Single   Tobacco Use   • Smoking status: Former Smoker     Packs/day: 2.00     Years: 37.00     Pack years: 74.00     Types: Cigarettes     Quit date:      Years since quittin.7   • Smokeless tobacco: Never Used   Vaping Use   • Vaping Use: Never used   Substance and Sexual Activity   • Alcohol use: Yes   • Drug use: Not Currently     Types: Marijuana   • Sexual activity: Defer        Past Medical History:   Diagnosis Date   • Arthritis    • Congestive heart failure (CHF) ([facility])    • COPD (chronic obstructive pulmonary disease) ([facility])    • Rhinitis, allergic    • Sleep apnea    • SOB (shortness of breath)         Immunization History   Administered Date(s) Administered   • Flublock Quad =>18yrs  10/12/2021   • Influenza Quad Vaccine (Inpatient) 10/03/2016   • Pneumococcal Conjugate 13-Valent (PCV13) 10/25/2016   • Pneumococcal Polysaccharide (PPSV23) 11/23/2020, 03/01/2021   • Td 08/28/2001         Allergies   Allergen Reactions   • Sulfa Antibiotics Unknown - Low Severity     Tongue swelling     • Latex, Natural Rubber Rash          Current Outpatient Medications:   •  allopurinol (ZYLOPRIM) 300 MG tablet, Take 300 mg by mouth Daily., Disp: , Rfl:   •  apixaban (ELIQUIS) 5 MG tablet tablet, Take 5 mg by mouth 2 (two) times a day., Disp: , Rfl:   •  atorvastatin (LIPITOR) 40 MG tablet, Take 40 mg by mouth., Disp: , Rfl:   •  budesonide (PULMICORT) 0.5 MG/2ML nebulizer solution, Take 0.5 mg by nebulization 2 (Two) Times a Day., Disp: , Rfl:   •  busPIRone (BUSPAR) 10 MG tablet, Take 10 mg by mouth 3 (Three) Times a Day., Disp: , Rfl:   •  carvedilol (COREG) 12.5 MG tablet, Take 1 tablet by mouth 2 (Two) Times a Day., Disp: 180 tablet, Rfl: 3  •  clotrimazole-betamethasone (LOTRISONE) 1-0.05 % cream, Apply to affected area externally three times a day, Disp: , Rfl:   •  fexofenadine (ALLEGRA) 60 MG tablet, Take 60 mg by mouth Daily., Disp: , Rfl:   •  folic acid (FOLVITE) 1 MG tablet, Take 1 mg by mouth Daily., Disp: , Rfl:   •  formoterol (PERFOROMIST) 20 MCG/2ML nebulizer solution, Inhale 20 mcg., Disp: , Rfl:   •  furosemide (LASIX) 40 MG tablet, Take 40 mg by mouth Daily. 2 tablets in the morning, Disp: , Rfl:   •  lisinopril (PRINIVIL,ZESTRIL) 5 MG tablet, Take 1 tablet by mouth Daily., Disp: 90 tablet, Rfl: 3  •  loratadine (CLARITIN) 10 MG tablet, Take 10 mg by mouth Daily., Disp: , Rfl:   •  montelukast (SINGULAIR) 10 MG tablet, Take 10 mg by mouth., Disp: , Rfl:   •  O2 (OXYGEN), Inhale 5 L/min Continuous., Disp: , Rfl:   •  potassium chloride (K-DUR,KLOR-CON) 20 MEQ CR tablet, Take 20 mEq by mouth., Disp: , Rfl:   •  revefenacin (Yupelri) 175 MCG/3ML nebulizer solution, Inhale 175 mcg., Disp: , Rfl:  "  •  venlafaxine XR (EFFEXOR-XR) 75 MG 24 hr capsule, Take 75 mg by mouth Daily., Disp: , Rfl:      Objective   Physical Exam  Constitutional:       General: He is not in acute distress.     Appearance: Normal appearance. He is obese.   HENT:      Right Ear: Hearing normal.      Left Ear: Hearing normal.      Nose: No nasal tenderness or congestion.      Mouth/Throat:      Mouth: Mucous membranes are moist. No oral lesions.   Eyes:      Extraocular Movements: Extraocular movements intact.      Pupils: Pupils are equal, round, and reactive to light.   Neck:      Thyroid: No thyroid mass or thyromegaly.   Cardiovascular:      Rate and Rhythm: Normal rate and regular rhythm.      Pulses: Normal pulses.      Heart sounds: Normal heart sounds. No murmur heard.      Pulmonary:      Effort: Pulmonary effort is normal.      Breath sounds: Decreased breath sounds present. No wheezing, rhonchi or rales.      Comments: Patient on 5 L of oxygen.  He is able speak full sentences without difficulty.  Chest:   Breasts:      Right: No axillary adenopathy.       Abdominal:      General: Bowel sounds are normal. There is no distension.      Palpations: Abdomen is soft.      Tenderness: There is no abdominal tenderness.   Musculoskeletal:      Cervical back: Neck supple.      Right lower leg: No edema.      Left lower leg: No edema.   Lymphadenopathy:      Cervical: No cervical adenopathy.      Upper Body:      Right upper body: No axillary adenopathy.   Skin:     General: Skin is warm and dry.      Findings: No lesion or rash.   Neurological:      General: No focal deficit present.      Mental Status: He is alert and oriented to person, place, and time.      Cranial Nerves: Cranial nerves are intact.   Psychiatric:         Mood and Affect: Affect normal. Mood is not anxious or depressed.         Vital Signs:   /87   Pulse 89   Temp 98.7 °F (37.1 °C) (Tympanic)   Resp 19   Ht 170.2 cm (67\")   Wt 92.5 kg (204 lb)   SpO2 90% " Comment: 5L Oxygen  BMI 31.95 kg/m²        Result Review :     CMP    CMP 4/23/21 4/23/21 4/23/21 4/24/21 4/24/21 4/25/21    0521 0521 2048 0732 0732    Glucose  105   98 101   BUN  14   20 23   Creatinine  0.6 (A)   0.7 0.7   eGFR  Am  166   139 139   Sodium  140   137 138   Potassium  3.3 (A) 4.8  4.0 3.9   Chloride  96 (A)   94 (A) 95 (A)   Calcium  8.3 (A)   8.6 8.6   Albumin 2.7 (A)   3.0 (A)     Total Bilirubin 1.87 (A)   1.89 (A)     Alkaline Phosphatase 64   66     AST (SGOT) 14   20     ALT (SGPT) 21   25     (A) Abnormal value       Comments are available for some flowsheets but are not being displayed.           CBC w/diff    CBC w/Diff 4/11/21 4/12/21 4/13/21   Hemoglobin 7.7 (A) 8.1 (A) 8.8 (A)   Hematocrit 23.4 (A) 24.3 (A) 26.4 (A)   (A) Abnormal value            Data reviewed: Radiologic studies Chest CT 12/2/2020 and Dr. Johns last office note   Procedures        Assessment and Plan    Diagnoses and all orders for this visit:    1. Chronic obstructive pulmonary disease, unspecified COPD type (HCC) (Primary)    2. Pulmonary emphysema, unspecified emphysema type (HCC)    3. Dyspnea on exertion    4. Chronic cough    5. Chronic respiratory failure with hypoxia and hypercapnia (HCC)    6. DARRON (obstructive sleep apnea)    7. Lung nodule  -     CT Chest Low Dose Wo; Future    8. Nicotine dependence, cigarettes, in remission  -     CT Chest Low Dose Wo; Future    9. Seasonal allergies    10. Post-nasal drip    11. Encounter for immunization  -     Flublok Quad >= 18YRS    12.  Continue Perforomist, Pulmicort, and Yupelri as prescribed.  Rinse mouth after each use.  13.  Continue albuterol as needed.  14.  Continue Claritin and Singulair for seasonal allergies.  15.  Continue supplemental oxygen to maintain saturations at or above 90%.  16.  Continue trilogy machine at night and with naps.  17.  Low-dose CT scan ordered for December 2021.  18.  Follow-up with Dr. Sanderson as scheduled in January,  sooner if needed.  19.  Strongly encouraged patient to receive the Covid vaccine at least 2 weeks after receiving his flu shot today.      Follow Up   Return for Next scheduled follow up.  Patient was given instructions and counseling regarding his condition or for health maintenance advice. Please see specific information pulled into the AVS if appropriate.

## 2021-11-05 ENCOUNTER — TELEPHONE (OUTPATIENT)
Dept: PULMONOLOGY | Facility: CLINIC | Age: 61
End: 2021-11-05

## 2021-11-05 NOTE — TELEPHONE ENCOUNTER
I called and spoke to this patients sister and she is saying that Mr. Cross's O2 has been staying around 88 on 4.5L of oxygen. He sent his Trilogy machine back two weeks ago because he didn't want it anymore, however the patient realizes now that it was helping him breathe better. He is now having difficulty breathing. Can we send him in an new order for a Trilogy machine?    I will START or STAY ON the medications listed below when I get home from the hospital:  None

## 2021-11-22 NOTE — PROGRESS NOTES
Cardiac Electrophysiology Outpatient Consult Note            [facility] at [facility]    Consult Note     Fly Cross  [id number]  11/23/2021  [phone]     Primary Care Physician: Bebo Beach APRN    Referred By: Bethanie Tam MD    Subjective     Chief Complaint:   Diagnoses and all orders for this visit:    1. Cardiomyopathy, dilated (HCC) (Primary)    2. Chronic combined systolic and diastolic congestive heart failure (HCC)    3. Primary hypertension    4. Persistent atrial fibrillation (HCC)    5. Obstructive sleep apnea syndrome    Other orders  -     ECG 12 Lead      Chief Complaint   Patient presents with   • Cardiomyopathy       History of Present Illness:   Fly Cross is a 61 y.o. male who presents to my electrophysiology clinic for evaluation of above complaints.    He has been in his usual state of affairs.  No chest pain nausea vomit fevers or chills.  Largely abstinent from alcohol.  Had an episode where he slipped fell and bruised the left side of his chest several weeks ago.  He is recovering from this.  No nausea vomit fevers chills syncope presyncope.        · Cardiology note dated 9/30/2021 was reviewed.  He presented with a history of dilated cardiomyopathy, hypertension, paroxysmal atrial fibrillation and COPD.  He reported doing well overall.  His lower extremity edema was stable.  His physical exam was significant for 1+ pitting edema bilateral lower extremities.  Blood pressure today is 131/78 heart rate of 81 bpm.        Past Medical History:   Patient Active Problem List    Diagnosis Date Noted   • Congestive heart failure (CHF) (HCC)    • Sleep apnea    • COPD (chronic obstructive pulmonary disease) (HCC)    • Arthritis    • Cardiomyopathy, dilated (HCC)      Note Last Updated: 11/22/2021     · [facility], 3/29/2012: EF 20-25 % with global hypokinesis.  Angiographically normal coronary arteries.  · Echocardiogram, 7/12/2017: EF probably  30-35%.  · Echocardiogram, 3/17/2021: EF 35-40%.  Dilated left atrium of 4.6 cm.  Poor study quality to assess valvular function     • Primary hypertension    • Mixed hyperlipidemia    • Chronic respiratory failure (HCC)    • Persistent atrial fibrillation (HCC)        Past Surgical History: History reviewed. No pertinent surgical history.    Social History:   Social History     Socioeconomic History   • Marital status: Single   Tobacco Use   • Smoking status: Former Smoker     Packs/day: 2.00     Years: 37.00     Pack years: 74.00     Types: Cigarettes     Quit date:      Years since quittin.9   • Smokeless tobacco: Never Used   Vaping Use   • Vaping Use: Never used   Substance and Sexual Activity   • Alcohol use: Yes   • Drug use: Not Currently     Types: Marijuana   • Sexual activity: Defer       Medications:     Current Outpatient Medications:   •  allopurinol (ZYLOPRIM) 300 MG tablet, Take 300 mg by mouth Daily., Disp: , Rfl:   •  apixaban (ELIQUIS) 5 MG tablet tablet, Take 5 mg by mouth 2 (two) times a day., Disp: , Rfl:   •  atorvastatin (LIPITOR) 40 MG tablet, Take 40 mg by mouth., Disp: , Rfl:   •  budesonide (PULMICORT) 0.5 MG/2ML nebulizer solution, Take 0.5 mg by nebulization 2 (Two) Times a Day., Disp: , Rfl:   •  busPIRone (BUSPAR) 10 MG tablet, Take 10 mg by mouth 3 (Three) Times a Day., Disp: , Rfl:   •  carvedilol (COREG) 12.5 MG tablet, Take 1 tablet by mouth 2 (Two) Times a Day., Disp: 180 tablet, Rfl: 3  •  clotrimazole-betamethasone (LOTRISONE) 1-0.05 % cream, Apply to affected area externally three times a day, Disp: , Rfl:   •  fexofenadine (ALLEGRA) 60 MG tablet, Take 60 mg by mouth Daily., Disp: , Rfl:   •  folic acid (FOLVITE) 1 MG tablet, Take 1 mg by mouth Daily., Disp: , Rfl:   •  formoterol (PERFOROMIST) 20 MCG/2ML nebulizer solution, Inhale 20 mcg., Disp: , Rfl:   •  furosemide (LASIX) 40 MG tablet, Take 40 mg by mouth Daily. 2 tablets in the morning, Disp: , Rfl:   •   "hydrOXYzine (ATARAX) 50 MG tablet, Take 50 mg by mouth Daily., Disp: , Rfl:   •  lisinopril (PRINIVIL,ZESTRIL) 5 MG tablet, Take 1 tablet by mouth Daily., Disp: 90 tablet, Rfl: 3  •  loratadine (CLARITIN) 10 MG tablet, Take 10 mg by mouth Daily., Disp: , Rfl:   •  metoprolol succinate XL (TOPROL-XL) 50 MG 24 hr tablet, Take 1 tablet by mouth 2 (Two) Times a Day., Disp: , Rfl:   •  montelukast (SINGULAIR) 10 MG tablet, Take 10 mg by mouth., Disp: , Rfl:   •  O2 (OXYGEN), Inhale 5 L/min Continuous., Disp: , Rfl:   •  potassium chloride (K-DUR,KLOR-CON) 20 MEQ CR tablet, Take 20 mEq by mouth., Disp: , Rfl:   •  revefenacin (Yupelri) 175 MCG/3ML nebulizer solution, Inhale 175 mcg., Disp: , Rfl:   •  venlafaxine XR (EFFEXOR-XR) 75 MG 24 hr capsule, Take 75 mg by mouth Daily., Disp: , Rfl:     Allergies:   Allergies   Allergen Reactions   • Sulfa Antibiotics Unknown - Low Severity     Tongue swelling     • Carisoprodol Unknown - High Severity     Collapsed lung  Collapsed lung     • Sulfamethoxazole-Trimethoprim Unknown - High Severity   • Latex, Natural Rubber Rash       Objective   Vital Signs:   Vitals:    11/23/21 1335   BP: 128/64   BP Location: Right arm   Patient Position: Sitting   Pulse: 93   SpO2: 92%   Weight: 92.5 kg (204 lb)   Height: 167.6 cm (66\")       PHYSICAL EXAM  General appearance: Awake, alert, cooperative  Head: Normocephalic, without obvious abnormality, atraumatic  Eyes: Conjunctivae/corneas clear, EOMs intact  Neck: no adenopathy, no carotid bruit, no JVD and thyroid: not enlarged  Lungs: clear to auscultation bilaterally and no rhonchi or crackles\", ' symmetric  Heart: regular rate and rhythm, S1, S2 normal, no murmur, click, rub or gallop  Abdomen: Soft, non-tender, bowel sounds normal,  no organomegaly  Extremities: extremities normal, atraumatic, no cyanosis or edema  Skin: Skin color, turgor normal, no rashes or lesions  Neurologic: Grossly normal     Lab Results   Component Value Date    " CALCIUM 8.6 04/25/2021     04/25/2021    K 3.9 04/25/2021    CO2 41 (H) 04/25/2021    CL 95 (L) 04/25/2021    BUN 23 04/25/2021    CREATININE 0.7 04/25/2021    EGFRIFAFRI 139 04/25/2021    ANIONGAP 6 04/25/2021     Lab Results   Component Value Date    HGB 8.8 (L) 04/13/2021    HCT 26.4 (L) 04/13/2021     Lab Results   Component Value Date    INR 0.95 04/11/2021    PROTIME 10.0 04/11/2021       Cardiac Testing:      I personally viewed and interpreted the patient's EKG/Telemetry/lab data      ECG 12 Lead    Date/Time: 11/22/2021 6:18 PM  Performed by: Steven Willoughby DO  Authorized by: Steven Willoughby DO   Previous ECG: no previous ECG available  Rhythm: sinus rhythm  Rate: normal  Conduction: conduction normal  ST Segments: ST segments normal  T Waves: T waves normal  QRS axis: normal  Other: no other findings    Clinical impression: normal ECG            Tobacco Cessation: N/A  Obstructive Sleep Apnea Screening: Completed    Assessment & Plan    Diagnoses and all orders for this visit:    1. Cardiomyopathy, dilated (HCC) (Primary)    2. Chronic combined systolic and diastolic congestive heart failure (HCC)    3. Primary hypertension    4. Persistent atrial fibrillation (HCC)    5. Obstructive sleep apnea syndrome    Other orders  -     ECG 12 Lead         Diagnosis Plan   1. Cardiomyopathy, dilated (HCC)   New York Heart Association functional class III.  Stable.  Euvolemic.  Doing well.    Ejection fraction 30% on one study.  25% on another.  Right bundle branch block.  Longstanding persistent atrial fibrillation.    Patient is referred for consideration for ICD system for the primary prevention of sudden cardiac arrest.  He is a reasonable candidate.  Discussed with him the option of transvenous ICD versus a totally subcutaneous ICD.  Transvenous ICD loss placement lead within the heart in the vascular space.  Discussed with him the risk of complications to include bleeding pneumothorax hemothorax cardiac  perforation tamponade lead dislodgment device recall of various components and inappropriate ICD shocks.  We discussed that there is clear evidence to suggest a reduction in relative risk of mortality of about 30% over a 6 to 8-year.  For this gentleman with an ICD system.  We also discussed the option of a totally subcutaneous ICD system.  The benefit of this would be we would be able to leave the vascular space untouched as well as his heart.  He has no indication for pacing.  He has no bradycardia he has no need for CRT.    After careful discussion of risk-benefit profile he wished to proceed with a totally subcutaneous ICD system.    Given his advanced lung disease we will schedule this with anesthesia.      Please especially note that the patient has been on maximally tolerated doses of guideline directed medical therapy, including but not limited to: HF indicated beta-blocker (carvedilol, metoprolol succinate), ACE Inhibitor or Angiotensin receptor blocker.  Please note that for some of these medications the maximally tolerated dose may be zero.  The patient has not had a myocardial infarction within 40 days and has not had coronary revascularization within 90 days.          This patient who is a candidate for an ICD has a life expectancy greater than 12 months.  The patient and I made a mutually shared decision ( shared decision making model ) that the patient would be best served by proceeding with the above invasive heart procedure.  This is a high risk invasive cardiac procedure which comes with significant risk of morbidity and mortality.  This patient has significant underlying  heart disease.  Fly Cross understands these risks and   has made an informed decision to proceed.     2. Chronic combined systolic and diastolic congestive heart failure (HCC)   stable.  Euvolemic.  Doing well.  Salt restriction.   3. Primary hypertension   blood pressure well controlled today.  Doing nicely.  Carvedilol.    4. Persistent atrial fibrillation (HCC)   apixaban for the primary prevention of stroke.  Doing reasonably well.   5. Obstructive sleep apnea syndrome   CPAP.  Compliant.     Body mass index is 32.93 kg/m².    I spent 48 minutes in consultation with this patient which included more than 65% of this time in direct face-to-face counseling, physical examination and discussion of my assessment and findings and shared decision making with the patient.  The remainder of the time not spent face to face was performing one, some or all of the following actions:  preparing to see this patient ( eg. Review of tests),  ordering medications, tests or procedures ), care coordination, discussion of the plan with other healthcare providers, documenting clinical information in Epic well as independently interpreting results and communicating results to patient, family and or caregiver.  All time noted occurred on the date of service.    Follow Up:       Thank you for allowing me to participate in the care of your patient. Please to not hesitate to contact me with additional questions or concerns.      Steven Willoughby DO, FACC, RS  Cardiac Electrophysiologist  [facility] / [facility]    Scribed for Steven Willoughby DO

## 2021-11-23 ENCOUNTER — OFFICE VISIT (OUTPATIENT)
Dept: CARDIOLOGY | Facility: CLINIC | Age: 61
End: 2021-11-23

## 2021-11-23 VITALS
HEIGHT: 66 IN | WEIGHT: 204 LBS | HEART RATE: 93 BPM | DIASTOLIC BLOOD PRESSURE: 64 MMHG | OXYGEN SATURATION: 92 % | BODY MASS INDEX: 32.78 KG/M2 | SYSTOLIC BLOOD PRESSURE: 128 MMHG

## 2021-11-23 DIAGNOSIS — I48.19 PERSISTENT ATRIAL FIBRILLATION (HCC): ICD-10-CM

## 2021-11-23 DIAGNOSIS — I50.42 CHRONIC COMBINED SYSTOLIC AND DIASTOLIC CONGESTIVE HEART FAILURE (HCC): ICD-10-CM

## 2021-11-23 DIAGNOSIS — I10 PRIMARY HYPERTENSION: ICD-10-CM

## 2021-11-23 DIAGNOSIS — I42.0 CARDIOMYOPATHY, DILATED (HCC): Primary | ICD-10-CM

## 2021-11-23 DIAGNOSIS — G47.33 OBSTRUCTIVE SLEEP APNEA SYNDROME: ICD-10-CM

## 2021-11-23 PROCEDURE — 99204 OFFICE O/P NEW MOD 45 MIN: CPT | Performed by: INTERNAL MEDICINE

## 2021-11-23 PROCEDURE — 93000 ELECTROCARDIOGRAM COMPLETE: CPT | Performed by: INTERNAL MEDICINE

## 2021-11-23 RX ORDER — HYDROXYZINE 50 MG/1
50 TABLET, FILM COATED ORAL DAILY
COMMUNITY
Start: 2021-11-10 | End: 2021-12-11

## 2021-11-23 RX ORDER — METOPROLOL SUCCINATE 50 MG/1
1 TABLET, EXTENDED RELEASE ORAL 2 TIMES DAILY
COMMUNITY
Start: 2021-10-26

## 2021-12-02 ENCOUNTER — TELEPHONE (OUTPATIENT)
Dept: CARDIOLOGY | Facility: CLINIC | Age: 61
End: 2021-12-02

## 2021-12-02 NOTE — TELEPHONE ENCOUNTER
Received VM from patient's caregiver.    Returned call. Verbal permission given to speak with roxanne Ugalde. Stated that yesterday his BP was 96/60 via home health. Stated home health RN called PCP and he stopped the lisinopril. Stated that they didn't want to stop without checking with Dr. Tam.     Asked patient how he felt with BP 96/60 and he stated he felt great. Advised that I would discuss with Dr. Tam and call back with updates. Advised to monitor BP at home.

## 2021-12-03 NOTE — TELEPHONE ENCOUNTER
Spoke with pt and he said his BP is better, back to normal now. I advised him to continue monitoring and if is start getting low is  ok per Dr. Kovacs to go back to lisinopril 2.5mg.

## 2021-12-06 ENCOUNTER — APPOINTMENT (OUTPATIENT)
Dept: CT IMAGING | Facility: HOSPITAL | Age: 61
End: 2021-12-06

## 2021-12-13 ENCOUNTER — HOSPITAL ENCOUNTER (OUTPATIENT)
Dept: CT IMAGING | Facility: HOSPITAL | Age: 61
Discharge: HOME | End: 2021-12-13
Admitting: NURSE PRACTITIONER

## 2021-12-13 DIAGNOSIS — F17.211 NICOTINE DEPENDENCE, CIGARETTES, IN REMISSION: ICD-10-CM

## 2021-12-13 DIAGNOSIS — R91.1 LUNG NODULE: ICD-10-CM

## 2021-12-13 PROCEDURE — 71271 CT THORAX LUNG CANCER SCR C-: CPT

## 2021-12-15 ENCOUNTER — PREP FOR SURGERY (OUTPATIENT)
Dept: OTHER | Facility: HOSPITAL | Age: 61
End: 2021-12-15

## 2021-12-15 DIAGNOSIS — I42.0 CARDIOMYOPATHY, DILATED (HCC): Primary | ICD-10-CM

## 2021-12-15 RX ORDER — ONDANSETRON 2 MG/ML
4 INJECTION INTRAMUSCULAR; INTRAVENOUS EVERY 6 HOURS PRN
Status: CANCELLED | OUTPATIENT
Start: 2021-12-15

## 2021-12-15 RX ORDER — SODIUM CHLORIDE 0.9 % (FLUSH) 0.9 %
3 SYRINGE (ML) INJECTION EVERY 12 HOURS SCHEDULED
Status: CANCELLED | OUTPATIENT
Start: 2021-12-15

## 2021-12-15 RX ORDER — NITROGLYCERIN 0.4 MG/1
0.4 TABLET SUBLINGUAL
Status: CANCELLED | OUTPATIENT
Start: 2021-12-15

## 2021-12-15 RX ORDER — ACETAMINOPHEN 325 MG/1
650 TABLET ORAL EVERY 4 HOURS PRN
Status: CANCELLED | OUTPATIENT
Start: 2021-12-15

## 2021-12-15 RX ORDER — CEFAZOLIN SODIUM 2 G/100ML
2 INJECTION, SOLUTION INTRAVENOUS
Status: CANCELLED | OUTPATIENT
Start: 2021-12-15

## 2021-12-15 RX ORDER — SODIUM CHLORIDE 0.9 % (FLUSH) 0.9 %
10 SYRINGE (ML) INJECTION AS NEEDED
Status: CANCELLED | OUTPATIENT
Start: 2021-12-15

## 2022-01-10 ENCOUNTER — OFFICE VISIT (OUTPATIENT)
Dept: PULMONOLOGY | Facility: CLINIC | Age: 62
End: 2022-01-10

## 2022-01-10 VITALS
TEMPERATURE: 98.4 F | DIASTOLIC BLOOD PRESSURE: 50 MMHG | RESPIRATION RATE: 20 BRPM | HEART RATE: 67 BPM | OXYGEN SATURATION: 96 % | SYSTOLIC BLOOD PRESSURE: 102 MMHG | BODY MASS INDEX: 31.53 KG/M2 | WEIGHT: 200.9 LBS | HEIGHT: 67 IN

## 2022-01-10 DIAGNOSIS — J96.11 CHRONIC RESPIRATORY FAILURE WITH HYPOXIA AND HYPERCAPNIA: ICD-10-CM

## 2022-01-10 DIAGNOSIS — J43.2 CENTRILOBULAR EMPHYSEMA: ICD-10-CM

## 2022-01-10 DIAGNOSIS — R91.1 LUNG NODULE: ICD-10-CM

## 2022-01-10 DIAGNOSIS — G47.33 OBSTRUCTIVE SLEEP APNEA SYNDROME: ICD-10-CM

## 2022-01-10 DIAGNOSIS — I50.42 CHRONIC COMBINED SYSTOLIC AND DIASTOLIC CONGESTIVE HEART FAILURE: Primary | ICD-10-CM

## 2022-01-10 DIAGNOSIS — I42.0 CARDIOMYOPATHY, DILATED: ICD-10-CM

## 2022-01-10 DIAGNOSIS — J96.12 CHRONIC RESPIRATORY FAILURE WITH HYPOXIA AND HYPERCAPNIA: ICD-10-CM

## 2022-01-10 PROCEDURE — 99214 OFFICE O/P EST MOD 30 MIN: CPT | Performed by: INTERNAL MEDICINE

## 2022-01-10 RX ORDER — IPRATROPIUM BROMIDE AND ALBUTEROL SULFATE 2.5; .5 MG/3ML; MG/3ML
3 SOLUTION RESPIRATORY (INHALATION) 4 TIMES DAILY PRN
Qty: 360 ML | Refills: 3 | Status: SHIPPED | OUTPATIENT
Start: 2022-01-10

## 2022-01-10 RX ORDER — MONTELUKAST SODIUM 10 MG/1
10 TABLET ORAL NIGHTLY
Qty: 90 TABLET | Refills: 12 | Status: SHIPPED | OUTPATIENT
Start: 2022-01-10

## 2022-01-10 RX ORDER — NYSTATIN 100000 [USP'U]/G
POWDER TOPICAL 4 TIMES DAILY
COMMUNITY
Start: 2021-12-03 | End: 2022-12-04

## 2022-01-10 RX ORDER — LORATADINE 10 MG/1
10 TABLET ORAL DAILY
Qty: 90 TABLET | Refills: 3 | Status: SHIPPED | OUTPATIENT
Start: 2022-01-10

## 2022-01-10 RX ORDER — BUDESONIDE 0.5 MG/2ML
0.5 INHALANT ORAL
Qty: 60 EACH | Refills: 12 | Status: SHIPPED | OUTPATIENT
Start: 2022-01-10 | End: 2022-04-01

## 2022-01-10 RX ORDER — FORMOTEROL FUMARATE 20 UG/2ML
20 SOLUTION RESPIRATORY (INHALATION)
Qty: 60 EACH | Refills: 12 | Status: SHIPPED | OUTPATIENT
Start: 2022-01-10 | End: 2022-04-01

## 2022-01-10 RX ORDER — REVEFENACIN 175 UG/3ML
175 SOLUTION RESPIRATORY (INHALATION)
Qty: 90 ML | Refills: 12 | Status: SHIPPED | OUTPATIENT
Start: 2022-01-10

## 2022-01-10 NOTE — PROGRESS NOTES
Primary Care Provider  Bebo Beach APRN     Referring Provider  No ref. provider found     Chief Complaint  COPD, Shortness of Breath, Wheezing, Cough, and Follow-up (3 month)    Subjective          Fly Cross presents to [facility]  History of Present Illness  Fly Cross is a 61 y.o. male patient with history of severe COPD, chronic hypoxic and hypercapnic respiratory failure he has been on Pulmicort, Perforomist and Yupelri nebulizers.  He is also on trilogy machine with sleep and naps.  He is here for follow-up.  He just received his trilogy machine a week ago.  However he has not been able to sleep well and has not been able to use NIPPV machine.  His DME companies prompt care through [employer].  He sees Dr. Calderon and is planned to have defibrillator in next month or so.  He has shortness of breath with heavy exertion.  He is using albuterol for rescue every other day or so.  He has no nausea or vomiting.  No fever or chills.  No change in weight or appetite.  He has not needed any antibiotics or steroids since his last office visit.  He has some leg swelling in both legs, more on the right side.  He has been going to wound care because of sacral decubitus.  He had repeat CT scan of the chest as low-dose lung cancer screening CT scan which was reviewed with him today.  Shows stable 7 mm lung nodule, left-sided rounded atelectasis and emphysema.  He also uses Flonase.  He is unwilling to be vaccinated for COVID-19 infection.    Review of Systems     General:  Fatigue, No Fever No weight loss or loss of appetite  HEENT: No dysphagia, No Visual Changes, no rhinorrhea  Respiratory:  + cough,+Dyspnea, mucoid phlegm, No Pleuritic Pain, no wheezing, no hemoptysis.  Cardiovascular: Denies chest pain, denies palpitations,+PHOENIX with minimal activity, No Chest Pressure  Gastrointestinal:  No Abdominal Pain, No Nausea, No Vomiting, No  Diarrhea  Genitourinary:  No Dysuria, No Frequency, No Hesitancy  Musculoskeletal: No muscle pain or swelling  Endocrine:  No Heat Intolerance, No Cold Intolerance  Hematologic:  No Bleeding, No Bruising  Psychiatric:  No Anxiety, No Depression  Neurologic:  No Confusion, no Dysarthria, No Headaches  Skin:  No Rash, No Open Wounds    Family History   Problem Relation Age of Onset   • Diabetes Sister         Unspecified type   • No Known Problems Mother    • No Known Problems Father         Social History     Socioeconomic History   • Marital status: Single   Tobacco Use   • Smoking status: Former Smoker     Packs/day: 2.00     Years: 37.00     Pack years: 74.00     Types: Cigarettes     Quit date:      Years since quittin.0   • Smokeless tobacco: Never Used   Vaping Use   • Vaping Use: Never used   Substance and Sexual Activity   • Alcohol use: Yes   • Drug use: Not Currently     Types: Marijuana   • Sexual activity: Defer        Past Medical History:   Diagnosis Date   • Arthritis    • Congestive heart failure (CHF) ([facility])    • COPD (chronic obstructive pulmonary disease) ([facility])    • Rhinitis, allergic    • Sleep apnea         Immunization History   Administered Date(s) Administered   • Flu Vaccine Intradermal Quad 18-64YR 2019   • Flu Vaccine Quad PF >36MO 2017, 10/23/2020   • [place] Quad =>18yrs 10/12/2021   • Influenza Quad Vaccine (Inpatient) 10/03/2016, 10/03/2016   • Pneumococcal Conjugate 13-Valent (PCV13) 10/25/2016, 10/25/2016   • Pneumococcal Polysaccharide (PPSV23) 2020, 2020, 2021, 2021   • Td 2001         Allergies   Allergen Reactions   • Sulfa Antibiotics Unknown - Low Severity     Tongue swelling     • Carisoprodol Unknown - High Severity     Collapsed lung  Collapsed lung     • Sulfamethoxazole-Trimethoprim Unknown - High Severity   • Latex, Natural Rubber Rash          Current Outpatient Medications:   •  allopurinol (ZYLOPRIM) 300 MG tablet, Take  300 mg by mouth Daily., Disp: , Rfl:   •  apixaban (ELIQUIS) 5 MG tablet tablet, Take 1 tablet by mouth Every 12 (Twelve) Hours., Disp: 60 tablet, Rfl: 12  •  atorvastatin (LIPITOR) 40 MG tablet, Take 40 mg by mouth., Disp: , Rfl:   •  budesonide (PULMICORT) 0.5 MG/2ML nebulizer solution, Take 2 mL by nebulization 2 (Two) Times a Day., Disp: 60 each, Rfl: 12  •  busPIRone (BUSPAR) 10 MG tablet, Take 10 mg by mouth 3 (Three) Times a Day., Disp: , Rfl:   •  carvedilol (COREG) 12.5 MG tablet, Take 1 tablet by mouth 2 (Two) Times a Day., Disp: 180 tablet, Rfl: 3  •  clotrimazole-betamethasone (LOTRISONE) 1-0.05 % cream, Apply to affected area externally three times a day, Disp: , Rfl:   •  fexofenadine (ALLEGRA) 60 MG tablet, Take 60 mg by mouth Daily., Disp: , Rfl:   •  folic acid (FOLVITE) 1 MG tablet, Take 1 mg by mouth Daily., Disp: , Rfl:   •  formoterol (PERFOROMIST) 20 MCG/2ML nebulizer solution, Take 2 mL by nebulization 2 (Two) Times a Day., Disp: 60 each, Rfl: 12  •  furosemide (LASIX) 40 MG tablet, Take 40 mg by mouth Daily. 2 tablets in the morning, Disp: , Rfl:   •  lisinopril (PRINIVIL,ZESTRIL) 5 MG tablet, Take 1 tablet by mouth Daily., Disp: 90 tablet, Rfl: 3  •  loratadine (CLARITIN) 10 MG tablet, Take 1 tablet by mouth Daily., Disp: 90 tablet, Rfl: 3  •  metoprolol succinate XL (TOPROL-XL) 50 MG 24 hr tablet, Take 1 tablet by mouth 2 (Two) Times a Day., Disp: , Rfl:   •  montelukast (SINGULAIR) 10 MG tablet, Take 1 tablet by mouth Every Night., Disp: 90 tablet, Rfl: 12  •  nystatin (MYCOSTATIN) 562598 UNIT/GM powder, Apply  topically to the appropriate area as directed 4 (Four) Times a Day., Disp: , Rfl:   •  O2 (OXYGEN), Inhale 5 L/min Continuous., Disp: , Rfl:   •  potassium chloride (K-DUR,KLOR-CON) 20 MEQ CR tablet, Take 20 mEq by mouth., Disp: , Rfl:   •  revefenacin (Yupelri) 175 MCG/3ML nebulizer solution, Take 3 mL by nebulization Daily., Disp: 90 mL, Rfl: 12  •  venlafaxine XR (EFFEXOR-XR) 75  "MG 24 hr capsule, Take 75 mg by mouth Daily., Disp: , Rfl:   •  ipratropium-albuterol (DUO-NEB) 0.5-2.5 mg/3 ml nebulizer, Take 3 mL by nebulization 4 (Four) Times a Day As Needed for Wheezing., Disp: 360 mL, Rfl: 3     Objective   Vital Signs:   /50 (BP Location: Left arm, Patient Position: Sitting, Cuff Size: Adult)   Pulse 67   Temp 98.4 °F (36.9 °C) (Tympanic)   Resp 20   Ht 170.2 cm (67\")   Wt 91.1 kg (200 lb 14.4 oz)   SpO2 96% Comment: nasal cannula/ 4 liters continous  BMI 31.47 kg/m²     Mallampatti classification : 1  Physical Exam  Vital Signs Reviewed  Pleasant male in mild distress, has conversational dyspnea, on nasal oxygen  HEENT:  PERRL, EOMI.  OP, nares clear, no sinus tenderness  Neck:  Supple, no JVD, no thyromegaly  Lymph: no axillary, cervical, supraclavicular lymphadenopathy noted bilaterally  Chest: Bilateral diminished breath sounds, scattered rhonchi, minimal crackles at bases, more on the right side, no wheezing, resonant to percussion bilaterally  CV: RRR, no MGR, pulses 2+, equal.  Abd:  Soft, NT, ND, + BS, no HSM  EXT:  no clubbing, no cyanosis, No BLE edema  Neuro:  A&Ox3, CN grossly intact, no focal deficits.  Skin: No rashes or lesions noted     Result Review :   The following data was reviewed by: Franco Sanderson MD on 01/10/2022:  Common labs    Common Labsle 4/23/21 4/23/21 4/23/21 4/24/21 4/24/21 4/25/21    0521 0521 2048 0732 0732    Glucose  105   98 101   BUN  14   20 23   Creatinine  0.6 (A)   0.7 0.7   eGFR  Am  166   139 139   Sodium  140   137 138   Potassium  3.3 (A) 4.8  4.0 3.9   Chloride  96 (A)   94 (A) 95 (A)   Calcium  8.3 (A)   8.6 8.6   Albumin 2.7 (A)   3.0 (A)     Total Bilirubin 1.87 (A)   1.89 (A)     Alkaline Phosphatase 64   66     AST (SGOT) 14   20     ALT (SGPT) 21   25     (A) Abnormal value       Comments are available for some flowsheets but are not being displayed.           CMP    CMP 4/23/21 4/23/21 4/23/21 4/24/21 4/24/21 " 4/25/21    0521 0521 2048 0732 0732    Glucose  105   98 101   BUN  14   20 23   Creatinine  0.6 (A)   0.7 0.7   eGFR  Am  166   139 139   Sodium  140   137 138   Potassium  3.3 (A) 4.8  4.0 3.9   Chloride  96 (A)   94 (A) 95 (A)   Calcium  8.3 (A)   8.6 8.6   Albumin 2.7 (A)   3.0 (A)     Total Bilirubin 1.87 (A)   1.89 (A)     Alkaline Phosphatase 64   66     AST (SGOT) 14   20     ALT (SGPT) 21   25     (A) Abnormal value       Comments are available for some flowsheets but are not being displayed.           CBC    CBC 4/11/21 4/12/21 4/13/21   Hemoglobin 7.7 (A) 8.1 (A) 8.8 (A)   Hematocrit 23.4 (A) 24.3 (A) 26.4 (A)   (A) Abnormal value            CBC w/diff    CBC w/Diff 4/11/21 4/12/21 4/13/21   Hemoglobin 7.7 (A) 8.1 (A) 8.8 (A)   Hematocrit 23.4 (A) 24.3 (A) 26.4 (A)   (A) Abnormal value            Data reviewed: Radiologic studies CT scan of the chest from December 2021 was reviewed.  It shows stable 7 mm right upper lobe lung nodule and rounded atelectasis on the left side.  Also shows emphysema.            Assessment and Plan    Diagnoses and all orders for this visit:    1. Chronic combined systolic and diastolic congestive heart failure (HCC) (Primary)    2. Cardiomyopathy, dilated (HCC)    3. Centrilobular emphysema (HCC)  -     revefenacin (Yupelri) 175 MCG/3ML nebulizer solution; Take 3 mL by nebulization Daily.  Dispense: 90 mL; Refill: 12  -     montelukast (SINGULAIR) 10 MG tablet; Take 1 tablet by mouth Every Night.  Dispense: 90 tablet; Refill: 12  -     formoterol (PERFOROMIST) 20 MCG/2ML nebulizer solution; Take 2 mL by nebulization 2 (Two) Times a Day.  Dispense: 60 each; Refill: 12  -     budesonide (PULMICORT) 0.5 MG/2ML nebulizer solution; Take 2 mL by nebulization 2 (Two) Times a Day.  Dispense: 60 each; Refill: 12  -     apixaban (ELIQUIS) 5 MG tablet tablet; Take 1 tablet by mouth Every 12 (Twelve) Hours.  Dispense: 60 tablet; Refill: 12  -     loratadine (CLARITIN) 10 MG  tablet; Take 1 tablet by mouth Daily.  Dispense: 90 tablet; Refill: 3    4. Chronic respiratory failure with hypoxia and hypercapnia (HCC)  -     revefenacin (Yupelri) 175 MCG/3ML nebulizer solution; Take 3 mL by nebulization Daily.  Dispense: 90 mL; Refill: 12  -     montelukast (SINGULAIR) 10 MG tablet; Take 1 tablet by mouth Every Night.  Dispense: 90 tablet; Refill: 12  -     formoterol (PERFOROMIST) 20 MCG/2ML nebulizer solution; Take 2 mL by nebulization 2 (Two) Times a Day.  Dispense: 60 each; Refill: 12  -     budesonide (PULMICORT) 0.5 MG/2ML nebulizer solution; Take 2 mL by nebulization 2 (Two) Times a Day.  Dispense: 60 each; Refill: 12  -     apixaban (ELIQUIS) 5 MG tablet tablet; Take 1 tablet by mouth Every 12 (Twelve) Hours.  Dispense: 60 tablet; Refill: 12  -     loratadine (CLARITIN) 10 MG tablet; Take 1 tablet by mouth Daily.  Dispense: 90 tablet; Refill: 3    5. Obstructive sleep apnea syndrome    6. Lung nodule    Other orders  -     ipratropium-albuterol (DUO-NEB) 0.5-2.5 mg/3 ml nebulizer; Take 3 mL by nebulization 4 (Four) Times a Day As Needed for Wheezing.  Dispense: 360 mL; Refill: 3    Continue with Perforomist, Pulmicort and Yupelri nebulizers regularly.  Use albuterol and DuoNeb as needed.  Continue oxygen at 2 to 3 L/min with rest and activities.  Continue with trilogy machine with sleep.  He is willing to try trazodone to be able to sleep on machine.  His [employer] company is [employer] from [place].  CT scan of the chest shows stable lung nodules and rounded atelectasis.  Reviewed the results with him today.  Plan for defibrillator through Dr. Yumiko skinner.  Refilled Singulair and Claritin.  Continue Eliquis 5 mg twice daily.  He is unwilling to have COVID-vaccine.  Risks of the viral infection was discussed in detail.  Patient unwilling even after prolonged counseling.  He refuses flu and pneumonia vaccine.  Follow-up with Maye Kruse in 3 months to review his trilogy use with  sleep.    Follow Up   Return in about 3 months (around 4/10/2022).  Patient was given instructions and counseling regarding his condition or for health maintenance advice. Please see specific information pulled into the AVS if appropriate.       Electronically signed by Franco Sanderson MD, 1/10/2022, 14:02 EST.

## 2022-04-01 DIAGNOSIS — J96.12 CHRONIC RESPIRATORY FAILURE WITH HYPOXIA AND HYPERCAPNIA: ICD-10-CM

## 2022-04-01 DIAGNOSIS — J96.11 CHRONIC RESPIRATORY FAILURE WITH HYPOXIA AND HYPERCAPNIA: ICD-10-CM

## 2022-04-01 DIAGNOSIS — J43.2 CENTRILOBULAR EMPHYSEMA: ICD-10-CM

## 2022-04-01 RX ORDER — BUDESONIDE 0.5 MG/2ML
INHALANT ORAL
Qty: 60 EACH | Refills: 11 | Status: SHIPPED | OUTPATIENT
Start: 2022-04-01

## 2022-04-01 RX ORDER — FORMOTEROL FUMARATE DIHYDRATE 20 UG/2ML
SOLUTION RESPIRATORY (INHALATION)
Qty: 60 EACH | Refills: 11 | Status: SHIPPED | OUTPATIENT
Start: 2022-04-01